# Patient Record
Sex: MALE | Race: WHITE
[De-identification: names, ages, dates, MRNs, and addresses within clinical notes are randomized per-mention and may not be internally consistent; named-entity substitution may affect disease eponyms.]

---

## 2019-07-03 NOTE — NUR
07/03/19 [address]. NOTIFIED OF ELEVATED BP, NO ORDERS GIVEN AT THIS TIME. PT.
IS ANXIOUS & ALSO C/O BACK PAIN RATING "2-3". PT. HAS CHRONIC BACK
PAIN. PT. HAD A PILLOW UNDERNEATH HIS KNEES BUT WANTED IT REMOVED. PT.
INSTRUCTED TO CALL IF NEEDED ANYTHING. CALL LIGHT IS WITHIN REACH.

## 2020-09-21 ENCOUNTER — HOSPITAL ENCOUNTER (OUTPATIENT)
Dept: HOSPITAL 95 - MHTC | Age: 75
Discharge: HOME | End: 2020-09-21
Attending: INTERNAL MEDICINE
Payer: MEDICARE

## 2020-09-21 VITALS — HEIGHT: 69.02 IN | WEIGHT: 284.84 LBS | BODY MASS INDEX: 42.19 KG/M2

## 2020-09-21 DIAGNOSIS — Z79.899: ICD-10-CM

## 2020-09-21 DIAGNOSIS — I71.2: ICD-10-CM

## 2020-09-21 DIAGNOSIS — Z79.82: ICD-10-CM

## 2020-09-21 DIAGNOSIS — I10: ICD-10-CM

## 2020-09-21 DIAGNOSIS — G47.33: ICD-10-CM

## 2020-09-21 DIAGNOSIS — I71.4: Primary | ICD-10-CM

## 2020-09-21 DIAGNOSIS — I73.9: ICD-10-CM

## 2020-09-21 DIAGNOSIS — I25.10: ICD-10-CM

## 2020-09-21 DIAGNOSIS — E66.9: ICD-10-CM

## 2020-09-21 PROCEDURE — C1769 GUIDE WIRE: HCPCS

## 2020-09-21 PROCEDURE — C1887 CATHETER, GUIDING: HCPCS

## 2020-09-21 PROCEDURE — C1894 INTRO/SHEATH, NON-LASER: HCPCS

## 2020-09-21 NOTE — NUR
1415 ALL AIR REVOMED FROM THE TR BAND FOR THE SECOND TIME AND PROXIMAL TO THE
BAND WAS FULL AND BECOMING HARDER IN NATURE. ADDED 5 ML OF AIR BACK IN THE
FIRST TR BAND AND A SECOND TR BAND PLACED PROXIMAL WITH 5 ML IN THAT BAND
ALSO. DR. MATTHEWS AT THE BEDSIDE AND SPOKE WITH PATIENT AND WIFE ABOUT
RESULTS. ALL QUESTIONS ANSWERED.

## 2020-09-21 NOTE — NUR
TR BANDS REMOVED ONE AT A TIME. R RADIAL SITE SOFT AND NONTENDER. 2ND TR BAND
REMOVED. SMALL HEMATOMA NOTED IN BETWEEN TR BANDS. PRESSURE APPLIED FOR 5
MINUTES. SITE SOFTENED AND DECREASED IN SIZE. CLOTH DOT PLACED OVER SITE AT
WRIST. PT AND WIFE BOTH FEEL COMFORTABLE TO GO HOME. PT TO PRIVATE VEHICLE PER
W/C WITH ASSIST OF ONE STAFF.

## 2020-09-21 NOTE — NUR
4625 TR BAND REMOVED AND SITE CLEANED, PENDING DISCHARGE. RIGHT RADIAL
DEVELOPED A HEMATOMA AND TR BAND REPLACED. 5 ML OF AIR ADDED. PATIENT UP AND
UNDERWEAR ON WITH ASSISTANCE FROM WITH. PATIENT TO THE RESTROOM.

## 2020-09-21 NOTE — NUR
1110 PATIENT RETURNED FROM THE [facility] VIA RECLINER. RIGHT RADIAL TR BAND IN
PLACE WITH 9 ML OF AIR IN THE BAND.  PATIENT PLACED ON THE MONITOR AND CALL
LIGHT IN REACH. VVS. NO PAIN NOTED FROM THE PATIENT.  NO BLEEDING NOTED. ARM
BOARD IN PLACE TO PREVENT PATIENT FROM USING THE RIGHT ARM.

## 2022-05-18 ENCOUNTER — HOSPITAL ENCOUNTER (OUTPATIENT)
Dept: HOSPITAL 95 - LAB SHORT | Age: 77
Discharge: HOME | End: 2022-05-18
Attending: STUDENT IN AN ORGANIZED HEALTH CARE EDUCATION/TRAINING PROGRAM
Payer: MEDICARE

## 2022-05-18 DIAGNOSIS — R19.5: Primary | ICD-10-CM

## 2022-05-18 PROCEDURE — G0328 FECAL BLOOD SCRN IMMUNOASSAY: HCPCS

## 2024-09-14 ENCOUNTER — HOSPITAL ENCOUNTER (INPATIENT)
Dept: HOSPITAL 95 - ER | Age: 79
LOS: 2 days | Discharge: HOME | DRG: 291 | End: 2024-09-16
Attending: HOSPITALIST | Admitting: HOSPITALIST
Payer: MEDICARE

## 2024-09-14 VITALS — HEIGHT: 69 IN | WEIGHT: 267.99 LBS | BODY MASS INDEX: 39.69 KG/M2

## 2024-09-14 VITALS — DIASTOLIC BLOOD PRESSURE: 60 MMHG | SYSTOLIC BLOOD PRESSURE: 178 MMHG

## 2024-09-14 VITALS — DIASTOLIC BLOOD PRESSURE: 59 MMHG | SYSTOLIC BLOOD PRESSURE: 183 MMHG

## 2024-09-14 VITALS — SYSTOLIC BLOOD PRESSURE: 165 MMHG | DIASTOLIC BLOOD PRESSURE: 50 MMHG

## 2024-09-14 DIAGNOSIS — I13.0: Primary | ICD-10-CM

## 2024-09-14 DIAGNOSIS — E66.9: ICD-10-CM

## 2024-09-14 DIAGNOSIS — N18.30: ICD-10-CM

## 2024-09-14 DIAGNOSIS — E04.1: ICD-10-CM

## 2024-09-14 DIAGNOSIS — I50.33: ICD-10-CM

## 2024-09-14 DIAGNOSIS — E78.5: ICD-10-CM

## 2024-09-14 DIAGNOSIS — J96.01: ICD-10-CM

## 2024-09-14 DIAGNOSIS — R91.1: ICD-10-CM

## 2024-09-14 DIAGNOSIS — Z79.899: ICD-10-CM

## 2024-09-14 DIAGNOSIS — M10.9: ICD-10-CM

## 2024-09-14 DIAGNOSIS — Z87.891: ICD-10-CM

## 2024-09-14 DIAGNOSIS — I71.40: ICD-10-CM

## 2024-09-14 DIAGNOSIS — J40: ICD-10-CM

## 2024-09-14 DIAGNOSIS — I71.21: ICD-10-CM

## 2024-09-14 DIAGNOSIS — G47.33: ICD-10-CM

## 2024-09-14 DIAGNOSIS — Z79.82: ICD-10-CM

## 2024-09-14 DIAGNOSIS — I35.1: ICD-10-CM

## 2024-09-14 LAB
ALBUMIN SERPL BCP-MCNC: 3.1 G/DL (ref 3.4–5)
ALBUMIN/GLOB SERPL: 0.8 {RATIO} (ref 0.8–1.8)
ALT SERPL W P-5'-P-CCNC: 21 U/L (ref 12–78)
ANION GAP SERPL CALCULATED.4IONS-SCNC: 11 MMOL/L (ref 3–11)
AST SERPL W P-5'-P-CCNC: 16 U/L (ref 12–37)
BASOPHILS # BLD AUTO: 0.03 K/MM3 (ref 0–0.23)
BASOPHILS NFR BLD AUTO: 1 % (ref 0–2)
BILIRUB SERPL-MCNC: 0.9 MG/DL (ref 0.1–1)
BUN SERPL-MCNC: 29 MG/DL (ref 8–24)
CALCIUM SERPL-MCNC: 9.7 MG/DL (ref 8.5–10.1)
CHLORIDE SERPL-SCNC: 110 MMOL/L (ref 98–108)
CO2 SERPL-SCNC: 27 MMOL/L (ref 21–32)
CREAT SERPL-MCNC: 1.45 MG/DL (ref 0.6–1.2)
DEPRECATED RDW RBC AUTO: 48.5 FL (ref 35.1–46.3)
EOSINOPHIL # BLD AUTO: 0.16 K/MM3 (ref 0–0.68)
EOSINOPHIL NFR BLD AUTO: 3 % (ref 0–6)
ERYTHROCYTE [DISTWIDTH] IN BLOOD BY AUTOMATED COUNT: 14.8 % (ref 11.7–14.2)
GLOBULIN SER CALC-MCNC: 4 G/DL (ref 2.2–4)
GLUCOSE SERPL-MCNC: 129 MG/DL (ref 70–99)
HCT VFR BLD AUTO: 45.5 % (ref 37–53)
HGB BLD-MCNC: 14.6 G/DL (ref 13.5–17.5)
IMM GRANULOCYTES # BLD AUTO: 0.01 K/MM3 (ref 0–0.1)
IMM GRANULOCYTES NFR BLD AUTO: 0 % (ref 0–1)
LYMPHOCYTES # BLD AUTO: 1.18 K/MM3 (ref 0.84–5.2)
LYMPHOCYTES NFR BLD AUTO: 19 % (ref 21–46)
MCHC RBC AUTO-ENTMCNC: 32.1 G/DL (ref 31.5–36.5)
MCV RBC AUTO: 90 FL (ref 80–100)
MONOCYTES # BLD AUTO: 0.45 K/MM3 (ref 0.16–1.47)
MONOCYTES NFR BLD AUTO: 7 % (ref 4–13)
NEUTROPHILS # BLD AUTO: 4.46 K/MM3 (ref 1.96–9.15)
NEUTROPHILS NFR BLD AUTO: 71 % (ref 41–73)
NRBC # BLD AUTO: 0 K/MM3 (ref 0–0.02)
NRBC BLD AUTO-RTO: 0 /100 WBC (ref 0–0.2)
PLATELET # BLD AUTO: 167 K/MM3 (ref 150–400)
POTASSIUM SERPL-SCNC: 3.9 MMOL/L (ref 3.5–5.5)
PROT SERPL-MCNC: 7.1 G/DL (ref 6.4–8.2)
SODIUM SERPL-SCNC: 144 MMOL/L (ref 136–145)

## 2024-09-14 PROCEDURE — A9270 NON-COVERED ITEM OR SERVICE: HCPCS

## 2024-09-15 VITALS — DIASTOLIC BLOOD PRESSURE: 51 MMHG | SYSTOLIC BLOOD PRESSURE: 180 MMHG

## 2024-09-15 VITALS — DIASTOLIC BLOOD PRESSURE: 65 MMHG | SYSTOLIC BLOOD PRESSURE: 181 MMHG

## 2024-09-15 VITALS — SYSTOLIC BLOOD PRESSURE: 151 MMHG | DIASTOLIC BLOOD PRESSURE: 51 MMHG

## 2024-09-15 VITALS — SYSTOLIC BLOOD PRESSURE: 158 MMHG | DIASTOLIC BLOOD PRESSURE: 49 MMHG

## 2024-09-15 LAB
ALBUMIN SERPL BCP-MCNC: 2.7 G/DL (ref 3.4–5)
ALBUMIN/GLOB SERPL: 0.8 {RATIO} (ref 0.8–1.8)
ALT SERPL W P-5'-P-CCNC: 18 U/L (ref 12–78)
ANION GAP SERPL CALCULATED.4IONS-SCNC: 15 MMOL/L (ref 3–11)
AST SERPL W P-5'-P-CCNC: 16 U/L (ref 12–37)
BASOPHILS # BLD AUTO: 0 K/MM3 (ref 0–0.23)
BASOPHILS NFR BLD AUTO: 0 % (ref 0–2)
BILIRUB SERPL-MCNC: 1.2 MG/DL (ref 0.1–1)
BUN SERPL-MCNC: 36 MG/DL (ref 8–24)
CALCIUM SERPL-MCNC: 9.4 MG/DL (ref 8.5–10.1)
CHLORIDE SERPL-SCNC: 108 MMOL/L (ref 98–108)
CO2 SERPL-SCNC: 25 MMOL/L (ref 21–32)
CREAT SERPL-MCNC: 1.55 MG/DL (ref 0.6–1.2)
DEPRECATED RDW RBC AUTO: 47.2 FL (ref 35.1–46.3)
EOSINOPHIL # BLD AUTO: 0 K/MM3 (ref 0–0.68)
EOSINOPHIL NFR BLD AUTO: 0 % (ref 0–6)
ERYTHROCYTE [DISTWIDTH] IN BLOOD BY AUTOMATED COUNT: 14.6 % (ref 11.7–14.2)
GLOBULIN SER CALC-MCNC: 3.5 G/DL (ref 2.2–4)
GLUCOSE SERPL-MCNC: 153 MG/DL (ref 70–99)
HCT VFR BLD AUTO: 40.9 % (ref 37–53)
HGB BLD-MCNC: 13.7 G/DL (ref 13.5–17.5)
IMM GRANULOCYTES # BLD AUTO: 0.02 K/MM3 (ref 0–0.1)
IMM GRANULOCYTES NFR BLD AUTO: 0 % (ref 0–1)
LYMPHOCYTES # BLD AUTO: 0.75 K/MM3 (ref 0.84–5.2)
LYMPHOCYTES NFR BLD AUTO: 15 % (ref 21–46)
MCHC RBC AUTO-ENTMCNC: 33.5 G/DL (ref 31.5–36.5)
MCV RBC AUTO: 88 FL (ref 80–100)
MONOCYTES # BLD AUTO: 0.04 K/MM3 (ref 0.16–1.47)
MONOCYTES NFR BLD AUTO: 1 % (ref 4–13)
NEUTROPHILS # BLD AUTO: 4.32 K/MM3 (ref 1.96–9.15)
NEUTROPHILS NFR BLD AUTO: 84 % (ref 41–73)
NRBC # BLD AUTO: 0 K/MM3 (ref 0–0.02)
NRBC BLD AUTO-RTO: 0 /100 WBC (ref 0–0.2)
PLATELET # BLD AUTO: 165 K/MM3 (ref 150–400)
POTASSIUM SERPL-SCNC: 3.4 MMOL/L (ref 3.5–5.5)
PROT SERPL-MCNC: 6.2 G/DL (ref 6.4–8.2)
SODIUM SERPL-SCNC: 145 MMOL/L (ref 136–145)

## 2024-09-15 NOTE — NUR
SHIFT SUMMARY
PT AWAKE WITH CPAP ON DURING SHIFT REPORT. PT IS VERY PLEASANT AND GRATEFUL
FOR CARE. UP INDEPENDENTLY IN  AND TO [facility]. PT ADMITTED FOR NEW DX OF CHF.
PT IMPROVED FROM ADMISSION; NOW BREATHING BETTER AND ON RA. DR DOWLING NOTIFIED
FOR DIET THIS AM. DR DOWLING LATER TO  TO SEE PT. ECHO DONE IN  THIS AM.
FAMILY TO  MOST OF DAY. PT HOPING TO GO HOME TOMORROW. UP WALKING HALLS THIS
AFTERNOON, INDEPENDENTLY USING FWW FOR SAFETY. CALL LT IN REACH.

## 2024-09-15 NOTE — NUR
SHIFT SUMMARY:
 
Pt is admitted for acute diastolic CHF and is a full code. Is alert and able
to make needs known. ADLs have been IND. denies pain or discomfort when asked.
Telly reports sinus in the 70s. On 3LPM of O2 via NC and with a bleed in for
CPAP.

## 2024-09-16 VITALS — SYSTOLIC BLOOD PRESSURE: 177 MMHG | DIASTOLIC BLOOD PRESSURE: 63 MMHG

## 2024-09-16 VITALS — DIASTOLIC BLOOD PRESSURE: 50 MMHG | SYSTOLIC BLOOD PRESSURE: 161 MMHG

## 2024-09-16 LAB
ANION GAP SERPL CALCULATED.4IONS-SCNC: 13 MMOL/L (ref 3–11)
BASOPHILS # BLD AUTO: 0.02 K/MM3 (ref 0–0.23)
BASOPHILS NFR BLD AUTO: 0 % (ref 0–2)
BUN SERPL-MCNC: 54 MG/DL (ref 8–24)
CALCIUM SERPL-MCNC: 9.4 MG/DL (ref 8.5–10.1)
CHLORIDE SERPL-SCNC: 105 MMOL/L (ref 98–108)
CO2 SERPL-SCNC: 26 MMOL/L (ref 21–32)
CREAT SERPL-MCNC: 1.76 MG/DL (ref 0.6–1.2)
DEPRECATED RDW RBC AUTO: 47.7 FL (ref 35.1–46.3)
EOSINOPHIL # BLD AUTO: 0.01 K/MM3 (ref 0–0.68)
EOSINOPHIL NFR BLD AUTO: 0 % (ref 0–6)
ERYTHROCYTE [DISTWIDTH] IN BLOOD BY AUTOMATED COUNT: 14.7 % (ref 11.7–14.2)
GLUCOSE SERPL-MCNC: 130 MG/DL (ref 70–99)
HCT VFR BLD AUTO: 40.1 % (ref 37–53)
HGB BLD-MCNC: 13.1 G/DL (ref 13.5–17.5)
IMM GRANULOCYTES # BLD AUTO: 0.03 K/MM3 (ref 0–0.1)
IMM GRANULOCYTES NFR BLD AUTO: 0 % (ref 0–1)
LYMPHOCYTES # BLD AUTO: 1.46 K/MM3 (ref 0.84–5.2)
LYMPHOCYTES NFR BLD AUTO: 14 % (ref 21–46)
MCHC RBC AUTO-ENTMCNC: 32.7 G/DL (ref 31.5–36.5)
MCV RBC AUTO: 89 FL (ref 80–100)
MONOCYTES # BLD AUTO: 0.7 K/MM3 (ref 0.16–1.47)
MONOCYTES NFR BLD AUTO: 7 % (ref 4–13)
NEUTROPHILS # BLD AUTO: 7.9 K/MM3 (ref 1.96–9.15)
NEUTROPHILS NFR BLD AUTO: 78 % (ref 41–73)
NRBC # BLD AUTO: 0 K/MM3 (ref 0–0.02)
NRBC BLD AUTO-RTO: 0 /100 WBC (ref 0–0.2)
PLATELET # BLD AUTO: 184 K/MM3 (ref 150–400)
POTASSIUM SERPL-SCNC: 3.3 MMOL/L (ref 3.5–5.5)
SODIUM SERPL-SCNC: 141 MMOL/L (ref 136–145)

## 2024-09-16 NOTE — NUR
SHIFT SUMMARY:
 
Pt is admitted for acute diastolic CHF and is a full code. Is alert and able
to make needs known. ADLs have been IND. denies pain or discomfort when asked.
Telly reports sinus in the 70s. Wears CPAP at night

## 2024-09-16 NOTE — NUR
DISCHARGE NOTE:
PT DISCHARGED AT APPROX 1130. THIS RN EDUCATED PATIENT ABOUT MEDICATOINS AND
TO RETURN TO ER IF SYMPTOMS WORSEN. PT STATED UNDERSTANDING. THIS RN ESCORTED
PATIENT AND FAMILY VIA WHEELCHAIR OUT TO TRANSPORTING VEHICLE. ALL QUESTIONS
ANSWERED, NO NEEDS AT TIME OF DISCHARGE. PIV REMOVED WITH TIP INTACT.

## 2025-02-05 ENCOUNTER — HOSPITAL ENCOUNTER (INPATIENT)
Dept: HOSPITAL 95 - ER | Age: 80
LOS: 3 days | Discharge: HOSPICE | DRG: 291 | End: 2025-02-08
Attending: STUDENT IN AN ORGANIZED HEALTH CARE EDUCATION/TRAINING PROGRAM | Admitting: STUDENT IN AN ORGANIZED HEALTH CARE EDUCATION/TRAINING PROGRAM
Payer: MEDICARE

## 2025-02-05 VITALS — SYSTOLIC BLOOD PRESSURE: 157 MMHG | DIASTOLIC BLOOD PRESSURE: 37 MMHG

## 2025-02-05 VITALS — BODY MASS INDEX: 36.63 KG/M2 | WEIGHT: 247.31 LBS | HEIGHT: 69 IN

## 2025-02-05 VITALS — SYSTOLIC BLOOD PRESSURE: 187 MMHG | DIASTOLIC BLOOD PRESSURE: 47 MMHG

## 2025-02-05 DIAGNOSIS — Z91.013: ICD-10-CM

## 2025-02-05 DIAGNOSIS — I13.0: Primary | ICD-10-CM

## 2025-02-05 DIAGNOSIS — J96.01: ICD-10-CM

## 2025-02-05 DIAGNOSIS — Z98.890: ICD-10-CM

## 2025-02-05 DIAGNOSIS — Z96.651: ICD-10-CM

## 2025-02-05 DIAGNOSIS — Z90.49: ICD-10-CM

## 2025-02-05 DIAGNOSIS — E66.9: ICD-10-CM

## 2025-02-05 DIAGNOSIS — Z79.899: ICD-10-CM

## 2025-02-05 DIAGNOSIS — Z79.82: ICD-10-CM

## 2025-02-05 DIAGNOSIS — M10.9: ICD-10-CM

## 2025-02-05 DIAGNOSIS — Z91.018: ICD-10-CM

## 2025-02-05 DIAGNOSIS — I50.31: ICD-10-CM

## 2025-02-05 DIAGNOSIS — J44.1: ICD-10-CM

## 2025-02-05 DIAGNOSIS — G47.33: ICD-10-CM

## 2025-02-05 DIAGNOSIS — N18.9: ICD-10-CM

## 2025-02-05 DIAGNOSIS — Z66: ICD-10-CM

## 2025-02-05 DIAGNOSIS — I71.40: ICD-10-CM

## 2025-02-05 DIAGNOSIS — Z79.51: ICD-10-CM

## 2025-02-05 DIAGNOSIS — Z99.81: ICD-10-CM

## 2025-02-05 DIAGNOSIS — Z88.5: ICD-10-CM

## 2025-02-05 DIAGNOSIS — Z87.891: ICD-10-CM

## 2025-02-05 LAB
ALBUMIN SERPL BCP-MCNC: 2.8 G/DL (ref 3.4–5)
ALBUMIN/GLOB SERPL: 0.7 {RATIO} (ref 0.8–1.8)
ALT SERPL W P-5'-P-CCNC: 17 U/L (ref 12–78)
ANION GAP SERPL CALCULATED.4IONS-SCNC: 10 MMOL/L (ref 3–11)
AST SERPL W P-5'-P-CCNC: 12 U/L (ref 12–37)
BASOPHILS # BLD AUTO: 0.01 K/MM3 (ref 0–0.23)
BASOPHILS NFR BLD AUTO: 0 % (ref 0–2)
BILIRUB SERPL-MCNC: 0.6 MG/DL (ref 0.1–1)
BUN SERPL-MCNC: 56 MG/DL (ref 8–24)
CALCIUM SERPL-MCNC: 9.6 MG/DL (ref 8.5–10.1)
CHLORIDE SERPL-SCNC: 110 MMOL/L (ref 98–108)
CO2 SERPL-SCNC: 26 MMOL/L (ref 21–32)
CREAT SERPL-MCNC: 1.48 MG/DL (ref 0.6–1.2)
DEPRECATED RDW RBC AUTO: 45.8 FL (ref 35.1–46.3)
EOSINOPHIL # BLD AUTO: 0 K/MM3 (ref 0–0.68)
EOSINOPHIL NFR BLD AUTO: 0 % (ref 0–6)
ERYTHROCYTE [DISTWIDTH] IN BLOOD BY AUTOMATED COUNT: 14.4 % (ref 11.7–14.2)
GLOBULIN SER CALC-MCNC: 4.1 G/DL (ref 2.2–4)
GLUCOSE SERPL-MCNC: 138 MG/DL (ref 70–99)
HCT VFR BLD AUTO: 42 % (ref 37–53)
HGB BLD-MCNC: 13.8 G/DL (ref 13.5–17.5)
IMM GRANULOCYTES # BLD AUTO: 0.03 K/MM3 (ref 0–0.1)
IMM GRANULOCYTES NFR BLD AUTO: 0 % (ref 0–1)
LYMPHOCYTES # BLD AUTO: 0.87 K/MM3 (ref 0.84–5.2)
LYMPHOCYTES NFR BLD AUTO: 10 % (ref 21–46)
MCHC RBC AUTO-ENTMCNC: 32.9 G/DL (ref 31.5–36.5)
MCV RBC AUTO: 86 FL (ref 80–100)
MONOCYTES # BLD AUTO: 0.5 K/MM3 (ref 0.16–1.47)
MONOCYTES NFR BLD AUTO: 6 % (ref 4–13)
NEUTROPHILS # BLD AUTO: 7.55 K/MM3 (ref 1.96–9.15)
NEUTROPHILS NFR BLD AUTO: 84 % (ref 41–73)
NRBC # BLD AUTO: 0 K/MM3 (ref 0–0.02)
NRBC BLD AUTO-RTO: 0 /100 WBC (ref 0–0.2)
PLATELET # BLD AUTO: 189 K/MM3 (ref 150–400)
POTASSIUM SERPL-SCNC: 4.9 MMOL/L (ref 3.5–5.5)
PROT SERPL-MCNC: 6.9 G/DL (ref 6.4–8.2)
SODIUM SERPL-SCNC: 141 MMOL/L (ref 136–145)

## 2025-02-05 PROCEDURE — A9270 NON-COVERED ITEM OR SERVICE: HCPCS

## 2025-02-05 NOTE — NUR
ADMISSION NOTE
PT ADMITTED TO MEDICAL FLOOR FROM ED AT [facility] 1650 FOR CHF/COPD EXACERBATION.
PT ABLE TO STAND FROM WC AND AMBULATE TO HOSPITAL BED INDEPENDENTLY WITHOUT
ANY NOTED DISTRESS OR DISCOMFORT. PT COMES TO ROOM ON RA, PT PLACED ON
CONTINUOUS PULSE OX, O2 SATURATION LEVELS REMAIN ABOVE 92% AND PT DENIES SOB
WITH EXERTION AND TALKING. PT ABLE TO AMBULATE TO BATHROOM WITHOUT O2
SATURATION LEVELS DROPPING BELOW 92%. PT DENIES PAIN. ADNISSION COMPLETED AND
PT AND FAMILY ORIENTED TO ROOM. CALL LIGHT WITHIN PT REACH.

## 2025-02-06 VITALS — DIASTOLIC BLOOD PRESSURE: 48 MMHG | SYSTOLIC BLOOD PRESSURE: 161 MMHG

## 2025-02-06 VITALS — DIASTOLIC BLOOD PRESSURE: 38 MMHG | SYSTOLIC BLOOD PRESSURE: 149 MMHG

## 2025-02-06 VITALS — DIASTOLIC BLOOD PRESSURE: 49 MMHG | SYSTOLIC BLOOD PRESSURE: 170 MMHG

## 2025-02-06 VITALS — SYSTOLIC BLOOD PRESSURE: 181 MMHG | DIASTOLIC BLOOD PRESSURE: 40 MMHG

## 2025-02-06 VITALS — SYSTOLIC BLOOD PRESSURE: 211 MMHG | DIASTOLIC BLOOD PRESSURE: 58 MMHG

## 2025-02-06 VITALS — SYSTOLIC BLOOD PRESSURE: 176 MMHG | DIASTOLIC BLOOD PRESSURE: 35 MMHG

## 2025-02-06 VITALS — SYSTOLIC BLOOD PRESSURE: 180 MMHG | DIASTOLIC BLOOD PRESSURE: 48 MMHG

## 2025-02-06 VITALS — DIASTOLIC BLOOD PRESSURE: 35 MMHG | SYSTOLIC BLOOD PRESSURE: 174 MMHG

## 2025-02-06 VITALS — SYSTOLIC BLOOD PRESSURE: 160 MMHG | DIASTOLIC BLOOD PRESSURE: 91 MMHG

## 2025-02-06 VITALS — SYSTOLIC BLOOD PRESSURE: 152 MMHG | DIASTOLIC BLOOD PRESSURE: 52 MMHG

## 2025-02-06 LAB
ALBUMIN SERPL BCP-MCNC: 2.7 G/DL (ref 3.4–5)
ALBUMIN/GLOB SERPL: 0.7 {RATIO} (ref 0.8–1.8)
ALT SERPL W P-5'-P-CCNC: 17 U/L (ref 12–78)
ANION GAP SERPL CALCULATED.4IONS-SCNC: 13 MMOL/L (ref 3–11)
AST SERPL W P-5'-P-CCNC: 19 U/L (ref 12–37)
BASOPHILS # BLD AUTO: 0.01 K/MM3 (ref 0–0.23)
BASOPHILS NFR BLD AUTO: 0 % (ref 0–2)
BILIRUB SERPL-MCNC: 0.6 MG/DL (ref 0.1–1)
BUN SERPL-MCNC: 70 MG/DL (ref 8–24)
CALCIUM SERPL-MCNC: 9.1 MG/DL (ref 8.5–10.1)
CHLORIDE SERPL-SCNC: 104 MMOL/L (ref 98–108)
CO2 SERPL-SCNC: 24 MMOL/L (ref 21–32)
CREAT SERPL-MCNC: 1.63 MG/DL (ref 0.6–1.2)
DEPRECATED RDW RBC AUTO: 45.3 FL (ref 35.1–46.3)
EOSINOPHIL # BLD AUTO: 0 K/MM3 (ref 0–0.68)
EOSINOPHIL NFR BLD AUTO: 0 % (ref 0–6)
ERYTHROCYTE [DISTWIDTH] IN BLOOD BY AUTOMATED COUNT: 14.4 % (ref 11.7–14.2)
GLOBULIN SER CALC-MCNC: 3.8 G/DL (ref 2.2–4)
GLUCOSE SERPL-MCNC: 178 MG/DL (ref 70–99)
HCT VFR BLD AUTO: 40.3 % (ref 37–53)
HGB BLD-MCNC: 13.4 G/DL (ref 13.5–17.5)
IMM GRANULOCYTES # BLD AUTO: 0.03 K/MM3 (ref 0–0.1)
IMM GRANULOCYTES NFR BLD AUTO: 0 % (ref 0–1)
LYMPHOCYTES # BLD AUTO: 0.74 K/MM3 (ref 0.84–5.2)
LYMPHOCYTES NFR BLD AUTO: 9 % (ref 21–46)
MAGNESIUM SERPL-MCNC: 2.5 MG/DL (ref 1.6–2.4)
MCHC RBC AUTO-ENTMCNC: 33.3 G/DL (ref 31.5–36.5)
MCV RBC AUTO: 86 FL (ref 80–100)
MONOCYTES # BLD AUTO: 0.16 K/MM3 (ref 0.16–1.47)
MONOCYTES NFR BLD AUTO: 2 % (ref 4–13)
NEUTROPHILS # BLD AUTO: 7.1 K/MM3 (ref 1.96–9.15)
NEUTROPHILS NFR BLD AUTO: 88 % (ref 41–73)
NRBC # BLD AUTO: 0 K/MM3 (ref 0–0.02)
NRBC BLD AUTO-RTO: 0 /100 WBC (ref 0–0.2)
PHOSPHATE SERPL-MCNC: 3.9 MG/DL (ref 2.5–4.9)
PLATELET # BLD AUTO: 196 K/MM3 (ref 150–400)
POTASSIUM SERPL-SCNC: 3.9 MMOL/L (ref 3.5–5.5)
PROT SERPL-MCNC: 6.5 G/DL (ref 6.4–8.2)
SODIUM SERPL-SCNC: 137 MMOL/L (ref 136–145)

## 2025-02-06 NOTE — NUR
MET WITH PATIENT HIS WIFE VINAY, AND SON FLORINA. WE DISCUSSED HIS CURRENT
MEDICAL STATUS. HE RELAYED THAT HE IS FEELING MUCH BETTER AFTER HIS MEDICATION
ADJUSTMENT. PATIENT EXPRESSED THAT HIS GOALS ARE TO CONTINUE TREATMENT AND
HOPEFULL FIND A MEDICATION REGIMINE THAT MANAGES HIS SYMPTOMS. PROVIDED
THERAPUTIC LISTENING AS HE RELAYED HIS EXPRIENCES WITH HIS DISEASE PROCESS. PC
WILL CONTINUE TO FOLLOW

## 2025-02-06 NOTE — NUR
SHIFT SUMMARY
 
PT IS A/O X4, SBA TO THE RESTROOM. AMBULATING INDEPENDENTLY, PLEASANT AND
COOPERATIVE WITH CARE. PT DENIES PAIN AND DISCOMFORT. PT WEARING C-PAP DURING
THE NIGHT HOURS, CONTINUOUS PULSE OX WITH THE CPAP>97% SAT'S. PT IS
BRADYCARDIC, HR BTW.45-55BPM.RT BY THE BEDSIDE AT HS, NEB TX'S. LUNG SOUNDS
CLEAR, COARSE/DIMINISHED AT BASES. PT REPORTS SOB WHILE AMBULATING. +3 EDEMA
LE'S BILATERALLY. ENCOURAGED ELEVATING LE'S. NO ACUTE EVENTS DURING THIS
SHIFT. BED AT THE LOWEST POSITION, CALL LIGHT WITHIN REACH. PT IS ABLE TO MAKE
HIS NEEDS KNOWN.

## 2025-02-06 NOTE — NUR
PATIENT IS ALERT AND ORIENTED AND COOPERATIVE WITH CARE. C/O TREMORS THIS
EVENING. HR 56 BPM, HOLDING COREG PER DR. DALE ORDER. ON RA DURING THE DAY.
IND TO THE BATHROOM. LARGE BM TODAY. DR. REES IS AWARE OF THE PATIENT'S BP
AND TOLD THIS RN THAT HE IS OKAY WITH THE PATIENT'S SYSTOLIC BP RANGING FROM
150-170 mm Hg. paliative care in involved in his care. FAMILY WAS AT THE
BEDSIDE FOR MOST OF THE SHIFT. WILL CONTINUE TO MONITOR

## 2025-02-07 VITALS — SYSTOLIC BLOOD PRESSURE: 165 MMHG | DIASTOLIC BLOOD PRESSURE: 45 MMHG

## 2025-02-07 VITALS — SYSTOLIC BLOOD PRESSURE: 156 MMHG | DIASTOLIC BLOOD PRESSURE: 42 MMHG

## 2025-02-07 VITALS — DIASTOLIC BLOOD PRESSURE: 48 MMHG | SYSTOLIC BLOOD PRESSURE: 152 MMHG

## 2025-02-07 VITALS — DIASTOLIC BLOOD PRESSURE: 49 MMHG | SYSTOLIC BLOOD PRESSURE: 165 MMHG

## 2025-02-07 VITALS — SYSTOLIC BLOOD PRESSURE: 143 MMHG | DIASTOLIC BLOOD PRESSURE: 43 MMHG

## 2025-02-07 VITALS — SYSTOLIC BLOOD PRESSURE: 154 MMHG | DIASTOLIC BLOOD PRESSURE: 43 MMHG

## 2025-02-07 VITALS — SYSTOLIC BLOOD PRESSURE: 163 MMHG | DIASTOLIC BLOOD PRESSURE: 39 MMHG

## 2025-02-07 VITALS — DIASTOLIC BLOOD PRESSURE: 50 MMHG | SYSTOLIC BLOOD PRESSURE: 148 MMHG

## 2025-02-07 NOTE — NUR
PROVIDER REQUEST PC MEET WITH PATEINT, HIS WIFE, AND SON. PROVIDER RELAYED
PATIENT WANTED INFORMATION RE HOSPICE SERVICES. PC EXPLAINED WHAT HOSPICE
SERVICE PROVIDES. EDUCATED REGARDING QUALITY OF LIFE AND HOSPICE
PHILOSOPHY.  PATIENT AND FAMILY V/U AND REQUESTS HOSPICE CONSULT.

## 2025-02-07 NOTE — NUR
SHIFT SUMMARY
PATIENT WITH TREMORS DURING SHIFT REPORT. HE STATES STARTED TREMORS YESTERDAY
BUT WORSENED THIS AM. HE REPORTS OCCAISIONAL DIZZINESS UPON STANDING.
DR REES NOTIFIED AND MEDICATION DOSAGES WHERE REDUCED. PATIENT TOLERATED
ADJUSMENTS WELL AS THE DAY WENT ON AND THE DIZZINESS SUBSIDED. WRITER EDUCATED
ON IMPORTANCE OF SITTING AT EDGEE OF BED FOR 1 MINUTE AND THEN STANDING FOR 1
MINUTE TO ASSESS DIZZINESS OR LIGHTHEDED AND TO SIT BACK DOWN OR LAY DOWN IF
IT REOCCURS AND TO LET RN KNOWN. HE V/U.  HE IS AOX4 WIFE AT BEDSIDE, BED IN
LOW POSIITION, CALL LIGHT IN REACH. HE IS ABLE TO MAKE NEEDS KNOWN AND ALSO
WALKED 2 TIMES THIS EVENING AROUND THE UNIT WITH WALKER AND SBA.

## 2025-02-07 NOTE — NUR
SHIFT SUMMARY
 
PT AMBULATES INDEPENDENTLY W/I THE HOSPITAL ROOM. RT BY THE BEDSIDE SETTING UP
PT'S HOME CPAP FOR THE NIGHT. MELATONIN ADMINISTERED AS ORDERED. PT REPORTS NO
SLEEP DURING THE DAY OR PREVIOUS NIGHT. PT DENIES PAIN. LE EDEMA BILATERALLY
+2. LUNGS DIMINISHED PER AUSCULTATION. @HS, BREAK NURSE HELD SCHEDULED PO
HYDRAZALINE, VS:  152/54,P.55 BPM. NO ACUTE EVENTS DURING THIS SHIFT. BED AT
THE LOWEST POSITION, CALL LIGHT WITHIN REACH. PT IS ABLE TO MAKE HIS NEEDS
KNOWN AND IS COOPERATIVE WITH CARE.

## 2025-02-08 VITALS — DIASTOLIC BLOOD PRESSURE: 49 MMHG | SYSTOLIC BLOOD PRESSURE: 170 MMHG

## 2025-02-08 VITALS — DIASTOLIC BLOOD PRESSURE: 60 MMHG | SYSTOLIC BLOOD PRESSURE: 164 MMHG

## 2025-02-08 NOTE — NUR
SHIFT SUMMARY
 
NO ACUTE EVENTS DURING THIS SHIFT. PT'S WIFE VINAY SPENDING THE NIGHT IN THE
HOSPITAL ROOM. PT IS SBA TO THE RESTROOM. PLAN IS FOR PT TO DISCHARGE THIS AM
TO HOME ON HOSPICE. SCHEDULED HS HYDRAZALINE PO WAS HELD D/T PARAMETER
SBP>160.  PT DENIES PAIN AND DISCOMFORT. CONTINUING PT EDUCATION. PT
VERBALIZES UNDERSTANDING. BED AT THE LOWEST POSITION, CALL LIGHT W/I REACH. PT
IS ABLE TO MAKE HIS NEEDS KNOWN, IS PLEASANT AND COOPERATIVE WITH CARE.

## 2025-02-08 NOTE — NUR
PT IS BEING SENT [facility] WITH HOSPICE. PT WAS SENT WITH ALL INSTRUCTIONS AND
PAPERWORK. NO QUESTIONS OR CONCERNS FOR THE PT'S AND LOVED ONES.

## 2025-02-08 NOTE — NUR
MET WITH PATIENT, HIS WIFE, AND SON. KAY FROM [facility] WAS IN THIS SHIFT
FOR SOFT HAND OFF OF PATIENT CARE.